# Patient Record
Sex: FEMALE | Race: WHITE | NOT HISPANIC OR LATINO | Employment: OTHER | URBAN - METROPOLITAN AREA
[De-identification: names, ages, dates, MRNs, and addresses within clinical notes are randomized per-mention and may not be internally consistent; named-entity substitution may affect disease eponyms.]

---

## 2019-01-16 ENCOUNTER — OFFICE VISIT (OUTPATIENT)
Dept: INTERNAL MEDICINE | Facility: CLINIC | Age: 44
End: 2019-01-16
Payer: MEDICARE

## 2019-01-16 VITALS
HEIGHT: 64 IN | WEIGHT: 109.81 LBS | HEART RATE: 80 BPM | BODY MASS INDEX: 18.75 KG/M2 | TEMPERATURE: 97 F | DIASTOLIC BLOOD PRESSURE: 60 MMHG | SYSTOLIC BLOOD PRESSURE: 98 MMHG

## 2019-01-16 DIAGNOSIS — S92.502A CLOSED FRACTURE OF PHALANX OF LEFT FIFTH TOE, INITIAL ENCOUNTER: Primary | ICD-10-CM

## 2019-01-16 PROCEDURE — 99999 PR PBB SHADOW E&M-EST. PATIENT-LVL II: ICD-10-PCS | Mod: PBBFAC,,, | Performed by: FAMILY MEDICINE

## 2019-01-16 PROCEDURE — 99999 PR PBB SHADOW E&M-EST. PATIENT-LVL II: CPT | Mod: PBBFAC,,, | Performed by: FAMILY MEDICINE

## 2019-01-16 PROCEDURE — 99203 OFFICE O/P NEW LOW 30 MIN: CPT | Mod: S$GLB,,, | Performed by: FAMILY MEDICINE

## 2019-01-16 PROCEDURE — 3008F PR BODY MASS INDEX (BMI) DOCUMENTED: ICD-10-PCS | Mod: CPTII,S$GLB,, | Performed by: FAMILY MEDICINE

## 2019-01-16 PROCEDURE — 99203 PR OFFICE/OUTPT VISIT, NEW, LEVL III, 30-44 MIN: ICD-10-PCS | Mod: S$GLB,,, | Performed by: FAMILY MEDICINE

## 2019-01-16 PROCEDURE — 3008F BODY MASS INDEX DOCD: CPT | Mod: CPTII,S$GLB,, | Performed by: FAMILY MEDICINE

## 2019-01-16 RX ORDER — PAROXETINE 10 MG/1
TABLET, FILM COATED ORAL
COMMUNITY
Start: 2018-12-14 | End: 2022-07-28

## 2019-01-16 RX ORDER — OXYCODONE AND ACETAMINOPHEN 7.5; 325 MG/1; MG/1
TABLET ORAL
COMMUNITY
Start: 2019-01-14 | End: 2019-01-16

## 2019-01-16 RX ORDER — SUVOREXANT 15 MG/1
TABLET, FILM COATED ORAL
COMMUNITY
Start: 2019-01-11 | End: 2019-01-16

## 2019-01-16 RX ORDER — IBUPROFEN 600 MG/1
TABLET ORAL
COMMUNITY
Start: 2019-01-02 | End: 2019-01-16

## 2019-01-16 RX ORDER — TRAZODONE HYDROCHLORIDE 100 MG/1
TABLET ORAL
COMMUNITY
Start: 2018-12-07 | End: 2019-01-16

## 2019-01-16 RX ORDER — PROMETHAZINE HYDROCHLORIDE 25 MG/1
TABLET ORAL
COMMUNITY
Start: 2019-01-15 | End: 2019-01-16

## 2019-01-16 RX ORDER — IBUPROFEN 800 MG/1
TABLET ORAL
COMMUNITY
Start: 2019-01-15 | End: 2022-07-28

## 2019-01-16 RX ORDER — LIDOCAINE 50 MG/G
PATCH TOPICAL
COMMUNITY
Start: 2019-01-10 | End: 2019-01-16

## 2019-01-16 RX ORDER — MECLIZINE HCL 12.5 MG 12.5 MG/1
TABLET ORAL
COMMUNITY
Start: 2019-01-10 | End: 2019-01-16

## 2019-01-16 RX ORDER — NYSTATIN 100000 [USP'U]/ML
SUSPENSION ORAL
COMMUNITY
Start: 2018-11-29 | End: 2019-01-16

## 2019-01-16 RX ORDER — CLONAZEPAM 1 MG/1
TABLET ORAL
COMMUNITY
Start: 2019-01-07 | End: 2022-09-06

## 2019-01-16 NOTE — PROGRESS NOTES
Subjective:      Patient ID: Hannah De La Torre is a 43 y.o. female.    Chief Complaint: Foot Injury (left foot went to [facility])    Disclaimer:  This note is prepared using voice recognition software and as such is likely to have errors and has not been proof read. Please contact me for questions.     Hannah De La Torre presents today as a new patient to me for urgent visit for left foot pain. Current PCP is Dr. Parker at [facility].  She reports that on January 7th she ran into a wall at her home and injured her left foot.  She had severe pain at that time.  She was seen by her PCP Dr. Parker on January 10, 2019. X-rays at that time were negative. She was given a prescription of Percocet by Dr. Parker. On January 12, 2019 she presented to [facility] with complaints of left foot pain. Left foot x-ray obtained on January 12, 2019 showed acute traumatic minimally displaced fracture in the fifth digit proximal phalanx. She was given a prescription for Norco, a walking boot and crutches. She reports on January 13, 2019 dropping a casserole dish on the dorsal aspect of her left foot. She reports left foot pain is constant but worse with palpation and weightbearing. She reports bruising and swelling to the foot.  She then saw the nurse practitioner at her PCPs office on January 14th.  He referred her to orthopedics Dr. Cruz.  She then followed up with Dr. Cruz and then was in touch with him the following day which was January 15th.  The patient reports that Dr. Cruz and Dr. Parker were in agreement at that time for Dr. Parker to manage the pain medication.  The patient self reports that Dr. Cruz told her that he did not give pain medications unless he was taking them surgery.  She was told that it would be up to her primary care physician to determine her pain medicines.  The patient then was sent in and additional prescription for Percocet.  She reports  that she cannot take the Percocet.  This is where she is very frustrated.  She is frustrated because she has told them that she cannot take this and she is demanding today to have additional pain medicines.  She also states she cannot do Toradol.  She states she has been rotating ibuprofen and Tylenol but is not helping.  She states she has also been icing it.  She does have a boot in place and she is in a wheelchair initially.  She states she is also recovering currently from a recent umbilical hernia repair as well.  This is my 1st time to see the patient.    When I informed the patient that the way I Practice Medicine and Pain Management in relation to an orthopedic injury is if she has been evaluated by a specialist in under their care than the specialist will manage her pain medicine because she will have routine follow-up with them to make sure that is healing appropriately and not getting worse and thus to determine next steps of action including potentials for surgery versus transitioning from a boot to a brace.  I did offer her a Toradol injection today if necessary but she states she cannot take this because it makes her feel funny.  She has specifically if she could have Tylenol number threes.  I informed her I would not give her any pain medications other than Tylenol and ibuprofen.  I did offer her the potential to see a different specialist such as a podiatrist and that we may be able to get her in tomorrow for that but because of the late nature of the appointment today that it would be unlikely that she would receive a Podiatry appointment for today.  She was frustrated by this.  She states she wanted to stick with Dr. Cruz.  She states that she is in significant pain.  I can see where she has the bruise foot and the swelling in the left foot but at this time she has already received 2 pain medications, unfortunately Percocet which she states she cannot tolerate, however she needs to take this  "back up between either her PCP Dr. Parker or her orthopedist Dr. Cruz who is going to be providing the care in the management of her foot fracture.    I offered to see if there is anything else I can help her with today and she declined.  She wanted to just leave afterwards.                Lab Results   Component Value Date    WBC 9.68 05/29/2016    HGB 13.6 05/29/2016    HCT 39.2 05/29/2016     (H) 05/29/2016    ALT 28 05/29/2016    AST 34 05/29/2016     05/29/2016    K 4.0 05/29/2016     05/29/2016    CREATININE 0.9 05/29/2016    BUN 12 05/29/2016    CO2 22 (L) 05/29/2016    TSH 0.90 06/11/2011             Review of Systems   Constitutional: Positive for activity change.   Musculoskeletal: Positive for arthralgias, gait problem, joint swelling and myalgias.   Hematological: Bruises/bleeds easily.   Psychiatric/Behavioral: Positive for agitation.     Objective:     Vitals:    01/16/19 1526   BP: 98/60   Pulse: 80   Temp: 97.2 °F (36.2 °C)   Weight: 49.8 kg (109 lb 12.6 oz)   Height: 5' 4" (1.626 m)     Physical Exam   Constitutional: She appears well-developed. She appears distressed.   Thin white female in a wheelchair, agitated   HENT:   Head: Normocephalic and atraumatic.   Musculoskeletal:        Left foot: There is decreased range of motion, tenderness, swelling and deformity.        Feet:    Feet:   Left Foot:   Skin Integrity: Positive for erythema.   Psychiatric: Her affect is angry and inappropriate. Her speech is rapid and/or pressured. She is agitated and aggressive. Cognition and memory are normal. She expresses impulsivity.   Vitals reviewed.    Assessment:     1. Closed fracture of phalanx of left fifth toe, initial encounter      Plan:   Hannah MARINELLI was seen today for establish care and foot injury.    Diagnoses and all orders for this visit:    Closed fracture of phalanx of left fifth toe, initial encounter-initial encounter by me however she has seen her PCP and nurse " practitioner at their office 2 times, emergency room as well all within 5 days.  At this time advised her she needs to go with pain management under the care of the specialist that is going to be taking care of her fracture.  I offered her ibuprofen or Tylenol and icing.  She had just recently received 2 prescriptions of Percocet for which she does not want.  She specifically asked her Tylenol number threes and I advised her I would not give her controlled substance at this time.  I did offer her Podiatry but she declined.  She declined having a Toradol injection due to not feeling well when she takes it.  She will return back to Dr. Cruz  For care.  She can take it up with her current PCP Dr. Parker for additional pain medication or management.     was reviewed as below:     01/15/2019  1   08/10/2018  Belsomra 15 MG Tablet  90 90 Ra Zia  [id number] [facility] ([id number])  1  [place]   01/14/2019  1   01/14/2019  Oxycodon-Acetaminophen 7.5-325  12 3 Th Per  [id number] [facility] ([id number])  0 45.00 MME  [place]   01/12/2019  1   01/12/2019  Hydrocodone-Acetamin 5-325 MG  9 1 De Nor  [id number] [facility] ([id number])  0 45.00 MME  [place]   01/11/2019  1   01/10/2019  Oxycodone-Acetaminophen 5-325  12 2 Th Per  [id number] [facility] ([id number])  0 45.00 [place]   01/08/2019  1   12/05/2018  Clonazepam 1 MG Tablet  90 30 Ra Zia  [id number] [facility] ([id number])  0  [place]   01/07/2019  1   01/07/2019  Oxycodone-Acetaminophen 5-325  25 6 Mi Puy  [id number] [facility] ([id number])  0 31.25 MME  [place]   01/02/2019  1   01/02/2019  Oxycodone-Acetaminophen 5-325  15 3 Mi Puy  [id number] [facility] ([id number])  0 37.50 MME  [place]             Follow-up if symptoms worsen or fail to improve.    There are no Patient Instructions on file for this visit.